# Patient Record
(demographics unavailable — no encounter records)

---

## 2018-06-01 NOTE — P.PCN
Date of Procedure: 06/01/18


Procedure(s) Performed: 


BRIEF HISTORY: Patient is a 51-year-old pleasant white female scheduled for an 

elective colonoscopy as a part of screening for colorectal neoplasia.





PROCEDURE PERFORMED: Colonoscopy with snare polypectomy. 





PREOPERATIVE DIAGNOSIS: Screening for colon cancer. 





IV sedation per Anesthesia. 





PROCEDURE: After informed consent was obtained, the patient, was brought into 

the endoscopy unit. IV sedation was administered by Anesthesia under continuous 

monitoring.  Digital rectal examination was normal. Initially the Olympus CF-

160 flexible video colonoscope was then inserted in the rectum, gradually 

advanced into the cecum without any difficulty. Careful examination was 

performed as the scope was gradually being withdrawn. Ileocecal valve and the 

appendiceal orifice were visualized and appeared normal.  Prep was excellent. 

Mucosa of the cecum, ascending colon, transverse colon, descending colon, 

sigmoid colon, and rectum appeared normal.  In the proximal rectum there were 2 

polyps measuring 7-8 mm in size which were removed by snare polypectomy.  

Retroflexion was performed in the rectum and no lesions were seen. The patient 

tolerated the procedure well. 





IMPRESSION: 


7-8 mm broad-based proximal rectal polyps 2 status post polypectomy


Rest of the colon appeared normal





RECOMMENDATIONS:  Findings of this examination were discussed with the patient 

as well as a family.  She was advised to follow with the biopsy results and 

based the biopsy results have a repeat surveillance colonoscopy in 5 years

## 2018-06-07 NOTE — MM
Reason for exam: screening  (asymptomatic).



Physical Findings:

A clinical breast exam by your physician is recommended on an annual basis and 

results should be correlated with mammographic findings.



MG Screening Mammo w CAD

Bilateral CC and MLO view(s) were taken.

The breast tissue is heterogeneously dense. This may lower the sensitivity of 

mammography.  There is no discrete abnormality.  No significant changes when 

compared with prior studies.





ASSESSMENT: Negative, BI-RAD 1



RECOMMENDATION:

Routine screening mammogram of both breasts in 1 year.